# Patient Record
Sex: MALE | Race: BLACK OR AFRICAN AMERICAN | Employment: OTHER | URBAN - METROPOLITAN AREA
[De-identification: names, ages, dates, MRNs, and addresses within clinical notes are randomized per-mention and may not be internally consistent; named-entity substitution may affect disease eponyms.]

---

## 2017-05-18 PROBLEM — R60.9 PERIPHERAL EDEMA: Status: ACTIVE | Noted: 2017-05-18

## 2018-02-12 ENCOUNTER — HOSPITAL ENCOUNTER (OUTPATIENT)
Dept: LAB | Age: 58
Discharge: HOME | End: 2018-02-12

## 2018-02-12 PROCEDURE — 88305 TISSUE EXAM BY PATHOLOGIST: CPT | Performed by: UROLOGY

## 2018-02-14 ENCOUNTER — HOSPITAL ENCOUNTER (OUTPATIENT)
Dept: LAB | Age: 58
Discharge: HOME | End: 2018-02-14
Payer: COMMERCIAL

## 2018-02-14 PROCEDURE — 88344 IMHCHEM/IMCYTCHM EA MLT ANTB: CPT

## 2018-04-06 PROBLEM — K40.90 LEFT INGUINAL HERNIA: Status: ACTIVE | Noted: 2018-04-06

## 2018-04-06 PROBLEM — R60.9 PERIPHERAL EDEMA: Status: RESOLVED | Noted: 2017-05-18 | Resolved: 2018-04-06

## 2018-04-06 PROBLEM — K59.00 CONSTIPATION: Chronic | Status: ACTIVE | Noted: 2018-04-06

## 2018-04-06 PROBLEM — K59.00 CONSTIPATION: Status: ACTIVE | Noted: 2018-04-06

## 2018-04-06 PROBLEM — K40.90 LEFT INGUINAL HERNIA: Chronic | Status: ACTIVE | Noted: 2018-04-06

## 2018-12-04 NOTE — H&P (VIEW-ONLY)
Date: 2018      Name: Lauri Card      MRN: [id number]       : 1960       Age: 59 y.o. Sex: male        Wayne Riojas MD       CC:  No chief complaint on file. HPI:     Lauri Card is a 59 y.o. male who presents for evaluation of an inguinal hernia as a referral from Dr. Macy Westfall. The patient was seen on 18 for a left inguinal hernia that was reducible. I recommended surgery, but he never scheduled. He comes back with the same problem. The left groin has had more pain over the past 3 months. He has now decided he would like to have the hernia repaired. PMH:    Past Medical History:   Diagnosis Date    Arthritis     Bone fracture     cracked right ankle    Elevated PSA     Hypertension     Ligament tear of lower extremity     right ankle       PSH:    Past Surgical History:   Procedure Laterality Date    ABDOMEN SURGERY PROC UNLISTED  2018    inguinal hernia repair    HX ANKLE FRACTURE TX Right ,     HX COLONOSCOPY      MD COLSC FLX W/REMOVAL LESION BY HOT BX FORCEPS  2015            MEDS:    Current Outpatient Medications   Medication Sig    zolpidem (AMBIEN) 10 mg tablet Take 1/2 to 1 tablet at bedtime as needed for insomnia    amLODIPine (NORVASC) 5 mg tablet TAKE ONE (1) TABLET BY MOUTH DAILY    hydroCHLOROthiazide (MICROZIDE) 12.5 mg capsule Take 1 Cap by mouth daily.  terbinafine HCl (LAMISIL) 1 % topical cream Apply  to affected area two (2) times a day.  hydrocortisone (ANUSOL-HC) 25 mg supp Insert 1 Suppository into rectum every twelve (12) hours. PRN    senna-docusate (PERICOLACE) 8.6-50 mg per tablet Take 1-2 Tabs by mouth daily. Indications: constipation    triamcinolone acetonide (KENALOG) 0.1 % topical cream Apply  to affected area two (2) times a day. use thin layer     No current facility-administered medications for this visit.         ALLERGIES:      No Known Allergies    SH:    Social History     Tobacco Use    Smoking status: Former Smoker     Packs/day: 0.75     Years: 20.00     Pack years: 15.00     Types: Cigarettes     Last attempt to quit: 2000     Years since quittin.9    Smokeless tobacco: Never Used   Substance Use Topics    Alcohol use: No     Alcohol/week: 0.0 oz    Drug use: No       FH:    Family History   Problem Relation Age of Onset    Cancer Mother         colon    No Known Problems Sister     No Known Problems Father     Hypertension Brother     No Known Problems Sister     No Known Problems Sister     No Known Problems Sister     Diabetes Sister     No Known Problems Maternal Grandmother     No Known Problems Maternal Grandfather     No Known Problems Paternal Grandmother     No Known Problems Paternal Grandfather        ROS: The patient has no difficulty with chest pain or shortness of breath. No fever or chills. Comprehensive 13 point review of systems was otherwise unremarkable except as noted above. Physical Exam:     There were no vitals taken for this visit. General: Alert, oriented, cooperative black male in no acute distress. Eyes: Sclera are clear. Conjunctiva and lids within normal limits. No icterus. Ears and Nose: no gross deformities to visual inspection, gross hearing intact  Neck: Supple, trachea midline, no appreciable thyromegaly  Resp: Breathing is  non-labored. Lungs clear to auscultation without wheezing or rhonchi   CV: RRR. No murmurs, rubs or gallops appreciated. Abd: soft non-tender and non-distended without peritoneal signs. +bs    Groin: Small, reducible left inguinal hernia. No evidence of a right inguinal hernia. Psych:  Mood and affect appropriate. Short-term memory and understanding intact      Assessment/Plan:  Julián Rao is a 59 y.o. male who has signs and symptoms consistent with left inguinal hernia.     1. Laparoscopic, possible open, left inguinal hernia repair with mesh     I went over the risks of bleeding, infection, anesthesia, injury to the small bowel, large bowel, bladder and neurovascular structures in the groin area as well as the potential need to convert to an open procedure. Another potential problem mentioned was the risk of recurrence of the hernia. I went over the use of mesh. I highlighted the importance of following the post-op instructions, particularly the lifting restrictions. The patient understood the risks and wished to proceed.     Ildefonso Tubbs MD     [facility]   12/4/2018  6:45 PM

## 2018-12-20 ENCOUNTER — HOSPITAL ENCOUNTER (OUTPATIENT)
Dept: SURGERY | Age: 58
Discharge: HOME | End: 2018-12-20

## 2018-12-20 VITALS — BODY MASS INDEX: 39.37 KG/M2 | HEIGHT: 70 IN | WEIGHT: 275 LBS

## 2018-12-20 NOTE — PERIOP NOTES
Patient verified name and . Order for consent NOT found in EHR- unable to determine if it matches case posting; patient verifies procedure. Type 2 surgery, phone assessment complete. Orders not received. Labs per surgeon: no order in EMR at this time  Labs per anesthesia protocol: hgb, K+ and creatinine needed- CBC and CMP collected 18- results within anesthesia guidelines    Patient answered medical/surgical history questions at their best of ability. All prior to admission medications documented in [facility]. Patient instructed to take the following medications the day of surgery according to anesthesia guidelines with a small sip of water: amlodipine. Hold all vitamins 7 days prior to surgery and NSAIDS 5 days prior to surgery. Medications to be held- HCTZ DOS. Patient instructed on the following:  Arrive at A Entrance, time of arrival to be called the day before by 1700  NPO after midnight including gum, mints, and ice chips  Responsible adult must drive patient to the hospital, stay during surgery, and patient will need supervision 24 hours after anesthesia  Use antibacterial soap in shower the night before surgery and on the morning of surgery  All piercings must be removed prior to arrival.    Leave all valuables (money and jewelry) at home but bring insurance card and ID on DOS. Do not wear make-up, nail polish, lotions, cologne, perfumes, powders, or oil on skin. Patient teach back successful and patient demonstrates knowledge of instruction.

## 2018-12-25 ENCOUNTER — ANESTHESIA EVENT (OUTPATIENT)
Dept: SURGERY | Age: 58
End: 2018-12-25
Payer: COMMERCIAL

## 2018-12-26 ENCOUNTER — ANESTHESIA (OUTPATIENT)
Dept: SURGERY | Age: 58
End: 2018-12-26
Payer: COMMERCIAL

## 2018-12-26 ENCOUNTER — HOSPITAL ENCOUNTER (OUTPATIENT)
Age: 58
Setting detail: OUTPATIENT SURGERY
Discharge: HOME | End: 2018-12-26
Attending: SURGERY | Admitting: SURGERY
Payer: COMMERCIAL

## 2018-12-26 VITALS
TEMPERATURE: 98.5 F | RESPIRATION RATE: 16 BRPM | SYSTOLIC BLOOD PRESSURE: 174 MMHG | DIASTOLIC BLOOD PRESSURE: 77 MMHG | BODY MASS INDEX: 40.52 KG/M2 | WEIGHT: 282.4 LBS | OXYGEN SATURATION: 92 % | HEART RATE: 76 BPM

## 2018-12-26 DIAGNOSIS — K40.90 LEFT INGUINAL HERNIA: Primary | ICD-10-CM

## 2018-12-26 PROCEDURE — 77030035220 HC TRCR ENDOSC BLNTPRT ANCHR COVD -B: Performed by: SURGERY

## 2018-12-26 PROCEDURE — 77030002896 HC SUT CLP ABSRB J&J -B: Performed by: SURGERY

## 2018-12-26 PROCEDURE — 76210000016 HC OR PH I REC 1 TO 1.5 HR: Performed by: SURGERY

## 2018-12-26 PROCEDURE — 77030035051: Performed by: SURGERY

## 2018-12-26 PROCEDURE — 74011250636 HC RX REV CODE- 250/636

## 2018-12-26 PROCEDURE — 77030035048 HC TRCR ENDOSC OPTCL COVD -B: Performed by: SURGERY

## 2018-12-26 PROCEDURE — 77030039266 HC ADH SKN EXOFIN S2SG -A: Performed by: SURGERY

## 2018-12-26 PROCEDURE — 74011250636 HC RX REV CODE- 250/636: Performed by: ANESTHESIOLOGY

## 2018-12-26 PROCEDURE — C1727 CATH, BAL TIS DIS, NON-VAS: HCPCS | Performed by: SURGERY

## 2018-12-26 PROCEDURE — 76060000033 HC ANESTHESIA 1 TO 1.5 HR: Performed by: SURGERY

## 2018-12-26 PROCEDURE — 77030002912 HC SUT ETHBND J&J -A: Performed by: SURGERY

## 2018-12-26 PROCEDURE — 77030010299 HC STPLR INT COVD -E: Performed by: SURGERY

## 2018-12-26 PROCEDURE — 77030008522 HC TBNG INSUF LAPRO STRY -B: Performed by: SURGERY

## 2018-12-26 PROCEDURE — 77030039425 HC BLD LARYNG TRULITE DISP TELE -A: Performed by: ANESTHESIOLOGY

## 2018-12-26 PROCEDURE — 77030002933 HC SUT MCRYL J&J -A: Performed by: SURGERY

## 2018-12-26 PROCEDURE — 77030020782 HC GWN BAIR PAWS FLX 3M -B: Performed by: ANESTHESIOLOGY

## 2018-12-26 PROCEDURE — 74011250637 HC RX REV CODE- 250/637: Performed by: ANESTHESIOLOGY

## 2018-12-26 PROCEDURE — 77030008703 HC TU ET UNCUF COVD -A: Performed by: ANESTHESIOLOGY

## 2018-12-26 PROCEDURE — C1781 MESH (IMPLANTABLE): HCPCS | Performed by: SURGERY

## 2018-12-26 PROCEDURE — 77030034849: Performed by: SURGERY

## 2018-12-26 PROCEDURE — 74011250636 HC RX REV CODE- 250/636: Performed by: SURGERY

## 2018-12-26 PROCEDURE — 77030031139 HC SUT VCRL2 J&J -A: Performed by: SURGERY

## 2018-12-26 PROCEDURE — 74011000250 HC RX REV CODE- 250

## 2018-12-26 PROCEDURE — 76010000161 HC OR TIME 1 TO 1.5 HR INTENSV-TIER 1: Performed by: SURGERY

## 2018-12-26 PROCEDURE — 77030008477 HC STYL SATN SLP COVD -A: Performed by: ANESTHESIOLOGY

## 2018-12-26 DEVICE — MESH HERN L W10.8XL16CM L INGUINAL WHT POLYPR MFIL: Type: IMPLANTABLE DEVICE | Site: ABDOMEN | Status: FUNCTIONAL

## 2018-12-26 RX ORDER — SODIUM CHLORIDE, SODIUM LACTATE, POTASSIUM CHLORIDE, CALCIUM CHLORIDE 600; 310; 30; 20 MG/100ML; MG/100ML; MG/100ML; MG/100ML
75 INJECTION, SOLUTION INTRAVENOUS CONTINUOUS
Status: DISCONTINUED | OUTPATIENT
Start: 2018-12-26 | End: 2018-12-26 | Stop reason: HOSPADM

## 2018-12-26 RX ORDER — LIDOCAINE HYDROCHLORIDE 10 MG/ML
0.1 INJECTION INFILTRATION; PERINEURAL AS NEEDED
Status: DISCONTINUED | OUTPATIENT
Start: 2018-12-26 | End: 2018-12-26 | Stop reason: HOSPADM

## 2018-12-26 RX ORDER — OXYCODONE HYDROCHLORIDE 5 MG/1
10 TABLET ORAL
Status: DISCONTINUED | OUTPATIENT
Start: 2018-12-26 | End: 2018-12-26 | Stop reason: HOSPADM

## 2018-12-26 RX ORDER — HYDROMORPHONE HYDROCHLORIDE 2 MG/ML
0.5 INJECTION, SOLUTION INTRAMUSCULAR; INTRAVENOUS; SUBCUTANEOUS
Status: DISCONTINUED | OUTPATIENT
Start: 2018-12-26 | End: 2018-12-26 | Stop reason: HOSPADM

## 2018-12-26 RX ORDER — LIDOCAINE HYDROCHLORIDE 20 MG/ML
INJECTION, SOLUTION EPIDURAL; INFILTRATION; INTRACAUDAL; PERINEURAL AS NEEDED
Status: DISCONTINUED | OUTPATIENT
Start: 2018-12-26 | End: 2018-12-26 | Stop reason: HOSPADM

## 2018-12-26 RX ORDER — DEXAMETHASONE SODIUM PHOSPHATE 4 MG/ML
INJECTION, SOLUTION INTRA-ARTICULAR; INTRALESIONAL; INTRAMUSCULAR; INTRAVENOUS; SOFT TISSUE AS NEEDED
Status: DISCONTINUED | OUTPATIENT
Start: 2018-12-26 | End: 2018-12-26 | Stop reason: HOSPADM

## 2018-12-26 RX ORDER — ONDANSETRON 2 MG/ML
INJECTION INTRAMUSCULAR; INTRAVENOUS AS NEEDED
Status: DISCONTINUED | OUTPATIENT
Start: 2018-12-26 | End: 2018-12-26 | Stop reason: HOSPADM

## 2018-12-26 RX ORDER — MIDAZOLAM HYDROCHLORIDE 1 MG/ML
2 INJECTION, SOLUTION INTRAMUSCULAR; INTRAVENOUS ONCE
Status: DISCONTINUED | OUTPATIENT
Start: 2018-12-26 | End: 2018-12-26 | Stop reason: HOSPADM

## 2018-12-26 RX ORDER — ROCURONIUM BROMIDE 10 MG/ML
INJECTION, SOLUTION INTRAVENOUS AS NEEDED
Status: DISCONTINUED | OUTPATIENT
Start: 2018-12-26 | End: 2018-12-26 | Stop reason: HOSPADM

## 2018-12-26 RX ORDER — FENTANYL CITRATE 50 UG/ML
INJECTION, SOLUTION INTRAMUSCULAR; INTRAVENOUS AS NEEDED
Status: DISCONTINUED | OUTPATIENT
Start: 2018-12-26 | End: 2018-12-26 | Stop reason: HOSPADM

## 2018-12-26 RX ORDER — CEFAZOLIN SODIUM 1 G/3ML
INJECTION, POWDER, FOR SOLUTION INTRAMUSCULAR; INTRAVENOUS AS NEEDED
Status: DISCONTINUED | OUTPATIENT
Start: 2018-12-26 | End: 2018-12-26 | Stop reason: HOSPADM

## 2018-12-26 RX ORDER — EPHEDRINE SULFATE 50 MG/ML
INJECTION, SOLUTION INTRAVENOUS AS NEEDED
Status: DISCONTINUED | OUTPATIENT
Start: 2018-12-26 | End: 2018-12-26 | Stop reason: HOSPADM

## 2018-12-26 RX ORDER — NEOSTIGMINE METHYLSULFATE 1 MG/ML
INJECTION INTRAVENOUS AS NEEDED
Status: DISCONTINUED | OUTPATIENT
Start: 2018-12-26 | End: 2018-12-26 | Stop reason: HOSPADM

## 2018-12-26 RX ORDER — FENTANYL CITRATE 50 UG/ML
100 INJECTION, SOLUTION INTRAMUSCULAR; INTRAVENOUS ONCE
Status: DISCONTINUED | OUTPATIENT
Start: 2018-12-26 | End: 2018-12-26 | Stop reason: HOSPADM

## 2018-12-26 RX ORDER — OXYCODONE AND ACETAMINOPHEN 5; 325 MG/1; MG/1
1 TABLET ORAL
Qty: 30 TAB | Refills: 0 | Status: SHIPPED | OUTPATIENT
Start: 2018-12-26 | End: 2019-01-08 | Stop reason: ALTCHOICE

## 2018-12-26 RX ORDER — OXYCODONE HYDROCHLORIDE 5 MG/1
5 TABLET ORAL
Status: DISCONTINUED | OUTPATIENT
Start: 2018-12-26 | End: 2018-12-26 | Stop reason: HOSPADM

## 2018-12-26 RX ORDER — CEFAZOLIN SODIUM/WATER 2 G/20 ML
2 SYRINGE (ML) INTRAVENOUS
Status: DISCONTINUED | OUTPATIENT
Start: 2018-12-26 | End: 2018-12-26

## 2018-12-26 RX ORDER — PROPOFOL 10 MG/ML
INJECTION, EMULSION INTRAVENOUS AS NEEDED
Status: DISCONTINUED | OUTPATIENT
Start: 2018-12-26 | End: 2018-12-26 | Stop reason: HOSPADM

## 2018-12-26 RX ORDER — GLYCOPYRROLATE 0.2 MG/ML
INJECTION INTRAMUSCULAR; INTRAVENOUS AS NEEDED
Status: DISCONTINUED | OUTPATIENT
Start: 2018-12-26 | End: 2018-12-26 | Stop reason: HOSPADM

## 2018-12-26 RX ORDER — FAMOTIDINE 20 MG/1
20 TABLET, FILM COATED ORAL ONCE
Status: COMPLETED | OUTPATIENT
Start: 2018-12-26 | End: 2018-12-26

## 2018-12-26 RX ADMIN — CEFAZOLIN SODIUM 3 G: 1 INJECTION, POWDER, FOR SOLUTION INTRAMUSCULAR; INTRAVENOUS at 13:48

## 2018-12-26 RX ADMIN — SODIUM CHLORIDE, SODIUM LACTATE, POTASSIUM CHLORIDE, AND CALCIUM CHLORIDE 75 ML/HR: 600; 310; 30; 20 INJECTION, SOLUTION INTRAVENOUS at 12:25

## 2018-12-26 RX ADMIN — EPHEDRINE SULFATE 15 MG: 50 INJECTION, SOLUTION INTRAVENOUS at 14:07

## 2018-12-26 RX ADMIN — GLYCOPYRROLATE 0.4 MG: 0.2 INJECTION INTRAMUSCULAR; INTRAVENOUS at 14:57

## 2018-12-26 RX ADMIN — PROPOFOL 200 MG: 10 INJECTION, EMULSION INTRAVENOUS at 13:52

## 2018-12-26 RX ADMIN — ROCURONIUM BROMIDE 10 MG: 10 INJECTION, SOLUTION INTRAVENOUS at 14:41

## 2018-12-26 RX ADMIN — HYDROMORPHONE HYDROCHLORIDE 0.5 MG: 2 INJECTION, SOLUTION INTRAMUSCULAR; INTRAVENOUS; SUBCUTANEOUS at 15:23

## 2018-12-26 RX ADMIN — SODIUM CHLORIDE, SODIUM LACTATE, POTASSIUM CHLORIDE, AND CALCIUM CHLORIDE: 600; 310; 30; 20 INJECTION, SOLUTION INTRAVENOUS at 14:34

## 2018-12-26 RX ADMIN — SODIUM CHLORIDE, SODIUM LACTATE, POTASSIUM CHLORIDE, AND CALCIUM CHLORIDE: 600; 310; 30; 20 INJECTION, SOLUTION INTRAVENOUS at 14:28

## 2018-12-26 RX ADMIN — ROCURONIUM BROMIDE 40 MG: 10 INJECTION, SOLUTION INTRAVENOUS at 13:52

## 2018-12-26 RX ADMIN — FENTANYL CITRATE 50 MCG: 50 INJECTION, SOLUTION INTRAMUSCULAR; INTRAVENOUS at 13:52

## 2018-12-26 RX ADMIN — NEOSTIGMINE METHYLSULFATE 3 MG: 1 INJECTION INTRAVENOUS at 14:57

## 2018-12-26 RX ADMIN — FENTANYL CITRATE 25 MCG: 50 INJECTION, SOLUTION INTRAMUSCULAR; INTRAVENOUS at 14:25

## 2018-12-26 RX ADMIN — FAMOTIDINE 20 MG: 20 TABLET ORAL at 12:25

## 2018-12-26 RX ADMIN — FENTANYL CITRATE 25 MCG: 50 INJECTION, SOLUTION INTRAMUSCULAR; INTRAVENOUS at 14:19

## 2018-12-26 RX ADMIN — DEXAMETHASONE SODIUM PHOSPHATE 10 MG: 4 INJECTION, SOLUTION INTRA-ARTICULAR; INTRALESIONAL; INTRAMUSCULAR; INTRAVENOUS; SOFT TISSUE at 14:08

## 2018-12-26 RX ADMIN — ONDANSETRON 4 MG: 2 INJECTION INTRAMUSCULAR; INTRAVENOUS at 14:08

## 2018-12-26 RX ADMIN — LIDOCAINE HYDROCHLORIDE 100 MG: 20 INJECTION, SOLUTION EPIDURAL; INFILTRATION; INTRACAUDAL; PERINEURAL at 13:52

## 2018-12-26 RX ADMIN — Medication 3 G: at 12:24

## 2018-12-26 NOTE — INTERVAL H&P NOTE
H&P Update:  Alissa Heimlich was seen and examined. History and physical has been reviewed. The patient has been examined.  There have been no significant clinical changes since the completion of the originally dated History and Physical.    Signed By: Dareen Spatz, MD     December 26, 2018 12:17 PM

## 2018-12-26 NOTE — ANESTHESIA PREPROCEDURE EVALUATION
Anesthetic History   No history of anesthetic complications            Review of Systems / Medical History  Patient summary reviewed and pertinent labs reviewed    Pulmonary  Within defined limits                 Neuro/Psych   Within defined limits           Cardiovascular    Hypertension          CAD    Exercise tolerance: >4 METS     GI/Hepatic/Renal  Within defined limits              Endo/Other        Morbid obesity     Other Findings   Comments: exsmoker  ED  Insomnia  BPH         Physical Exam    Airway  Mallampati: II  TM Distance: 4 - 6 cm  Neck ROM: normal range of motion   Mouth opening: Normal     Cardiovascular  Regular rate and rhythm,  S1 and S2 normal,  no murmur, click, rub, or gallop  Rhythm: regular  Rate: normal         Dental    Dentition: Full upper dentures and Lower partial plate     Pulmonary  Breath sounds clear to auscultation               Abdominal  Abdominal exam normal       Other Findings            Anesthetic Plan    ASA: 3  Anesthesia type: general          Induction: Intravenous  Anesthetic plan and risks discussed with: Patient

## 2018-12-26 NOTE — OP NOTES
[address] [facility]  OPERATIVE REPORT    Balbina Sheehan  MR#: [id number]  : 1960  ACCOUNT #: [de-identified]   DATE OF SERVICE: 2018    PREOPERATIVE DIAGNOSES:  Left inguinal hernia and morbid obesity. POSTOPERATIVE DIAGNOSES:  Left inguinal hernia and morbid obesity. PROCEDURE PERFORMED:  Attempted TEPP left inguinal hernia, converted to a DOMO left inguinal hernia. SURGEON:  Taqueria Laguerre. Osman Conroy MD    ANESTHESIA:  General endotracheal anesthesia plus 20 mL 0.75% Marcaine used for local anesthesia at the end of the procedure. ESTIMATED BLOOD LOSS:  50 mL. SPECIMENS REMOVED:  None. COMPLICATIONS:  None. IMPLANTS:  None. ASSISTANT:  None. DRAINS:  None. HISTORY:  A 49-year-old male, who I was asked to see by Dr. Sara Young for left groin pain. I saw him in 2018. He returned in 10/2018 with left groin pain. I had originally recommended to him scheduling a laparoscopic, possible open, inguinal hernia repair. He failed to schedule it, came back in October, and then decided that he wanted to proceed with surgery. The patient was seen in the office. I went through the risks of bleeding, infection, anesthesia, injury to the small bowel, large bowel, bladder, neurovascular structures in the groin area, as well as potential for recurrence of the hernia. He was agreeable and signed a consent form. DESCRIPTION OF PROCEDURE:  The patient was seen in the preop area, where the left groin area was marked with a marking pen after he consented to my marking the area. We then transferred him to room #4 [facility].  He was placed on the operating table in supine position. General endotracheal anesthesia was administered without complication. He received 2 g of Ancef as prophylactic antibiotic coverage. The abdomen was prepped and draped in the usual sterile manner. Pepe catheter was already inserted by the nursing staff. Sequential compression devices were placed and used throughout the procedure. Timeout was done, identifying patient, surgeon, procedure, birthday of 1960. I made an incision inferior and to the left of the umbilicus overlying the left rectus abdominis muscle. The patient is morbidly obese. The adipose tissue was longer than the deep ends of the [employer] retractors. I enlarged the incision a little bit to get better visualization. We were able to place two stay sutures on either side of the anterior rectus sheath fascia that was transected. Finger dissection was used to develop the preperitoneal space. I put a balloon in. He has some adhesions in the preperitoneal space. The first balloon broke. We placed a second balloon in the area and got very limited dissection. We put a Timur in to this area, dissected, turned on the gas, and put the camera in. I made 2 incisions between the pubic bone and umbilicus and we placed two 5 mm trocars. There was limited space, limited visualization. He appears to have had something obliterate this preperitoneal space in the past.  At this point, we changed directions and instead of doing a TEPP (trans extra preperitoneal repair), we did a DOMO (total abdominal preperitoneal approach). I made an aperture through the posterior rectus sheath and peritoneum and placed the Timur trocar into the abdominal cavity. The patient had 2 other 5 mm trocars placed in the upper part of the abdomen. I used the scissors to score the peritoneum, I was able to identify the indirect inguinal hernia sac. It was pulled back away from this testicular artery, vein, and the spermatic cord, and lay in the preperitoneal space. A large 3DMax piece of mesh was rolled up in a cigar type fashion, placed in the preperitoneal space. The preperitoneal space was then closed with a running. We did not fix the mesh in position at all.   The peritoneum was closed with a running 0 Ethibond and Lapra-Tys. The 5 mm trocars were removed. Some bleeding from the trocar sites. The fascia of the umbilicus was closed with interrupted sutures of 0 Vicryl. All skin incisions were closed with surgical staples. The patient received 20 mL 0.75% Marcaine in divided dosage to the incision sites. The patient tolerated the procedure well, was extubated, and brought to recovery room in stable condition.       MD EWA Stevenson / Darlin Egan  D: 12/26/2018 15:47     T: 12/26/2018 18:24  JOB #: [id number]

## 2018-12-26 NOTE — BRIEF OP NOTE
BRIEF OPERATIVE NOTE    Date of Procedure: 12/26/2018   Preoperative Diagnosis: Left inguinal hernia [K40.90]  Postoperative Diagnosis: Left inguinal hernia [K40.90]    Procedure(s): DOMO LAPAROSCOPIC LEFT INGUINAL HERNIA REPAIR WITH MESH  Surgeon(s) and Role:     Romayne Andrews, MD - Primary         Surgical Assistant: None    Surgical Staff:  Circ-1: Cathy Kurtz RN  Scrub Tech-1: Artis Burrell  Scrub Tech-2: Veronica Garcia  Event Time In Time Out   Incision Start 1402    Incision Close       Anesthesia: General   Estimated Blood Loss: 25 cc's  Specimens: * No specimens in log *   Findings: See dictated note   Complications: None  Implants:   Implant Name Type Inv.  Item Serial No.  Lot No. LRB No. Used Action   MESH PATY LG LT 4X6IN -- 3DMAX - [id number]  MESH PATY LG LT 4X6IN -- 3DMAX [id number] BARD DAVOL [id number] Left 1 Implanted

## 2018-12-26 NOTE — DISCHARGE INSTRUCTIONS
1. Diet as tolerated. 2. Showering is allowed, but no tub baths or swimming. 3. Drainage is common from the wounds. Change the dressings as needed. Call our office if the wounds become reddened, tender, feel warm to the touch or pus starts to drain from the wound. 4. Take prescribed pain medication as directed, usually Percocet, Vicodin, Lorcet or Darvcocet. Take over the counter medication for minor pain. 5. Ice may be applied intermittently to the surgical site or sites. 6. Call or office, [phone], if problems arise. 7. Follow up in the office at the assigned time. 8. Resume all medications as taken per surgery, unless specifically instructed not to take certain ones. 9. No lifting more than 25 pounds until told otherwise. 10. Driving is allowed 5 days after surgery as long as you feel comfortable enough to drive and have not taken any prescription pain medication prior to driving. 11. Wear an athletic supporter when out of bed. 12. Place ice over the affected area intermittently for the first 2 to 3 days. 15. It is expected to have \"black and blue\" areas over the lower abdomen, groin, scrotum and base of the penis. 14. If you do not have an allergy, then I recommend taking two Naprosyn (Alleve) tablets in the morning and two at night. Save the narcotic pain medication for in between the Naprosyn.

## 2022-03-18 PROBLEM — K59.00 CONSTIPATION: Status: ACTIVE | Noted: 2018-04-06

## 2022-03-20 PROBLEM — K40.90 LEFT INGUINAL HERNIA: Status: ACTIVE | Noted: 2018-04-06

## 2022-07-28 DIAGNOSIS — R97.20 ELEVATED PSA: Primary | ICD-10-CM

## 2023-02-27 ENCOUNTER — NURSE ONLY (OUTPATIENT)
Dept: UROLOGY | Age: 63
End: 2023-02-27

## 2023-02-27 DIAGNOSIS — R97.20 ELEVATED PSA: ICD-10-CM

## 2023-02-28 LAB
PSA FREE MFR SERPL: 18.1 %
PSA FREE SERPL-MCNC: 1.7 NG/ML
PSA SERPL-MCNC: 9.4 NG/ML

## 2023-03-13 ENCOUNTER — TELEPHONE (OUTPATIENT)
Dept: UROLOGY | Age: 63
End: 2023-03-13

## 2023-03-14 NOTE — TELEPHONE ENCOUNTER
Left message on machine for patient requesting that they call back to make an appointment for PSA bloodwork about 1 week prior to next visit with .

## 2023-04-21 DIAGNOSIS — R97.20 ELEVATED PSA: Primary | ICD-10-CM

## 2023-04-25 ENCOUNTER — NURSE ONLY (OUTPATIENT)
Dept: UROLOGY | Age: 63
End: 2023-04-25

## 2023-04-25 DIAGNOSIS — R97.20 ELEVATED PSA: ICD-10-CM

## 2023-04-26 LAB — PSA SERPL-MCNC: 8.3 NG/ML

## 2023-05-01 ENCOUNTER — OFFICE VISIT (OUTPATIENT)
Dept: UROLOGY | Age: 63
End: 2023-05-01
Payer: MEDICARE

## 2023-05-01 DIAGNOSIS — N13.8 BPH WITH OBSTRUCTION/LOWER URINARY TRACT SYMPTOMS: ICD-10-CM

## 2023-05-01 DIAGNOSIS — N40.1 BPH WITH OBSTRUCTION/LOWER URINARY TRACT SYMPTOMS: ICD-10-CM

## 2023-05-01 DIAGNOSIS — R97.20 ELEVATED PSA: Primary | ICD-10-CM

## 2023-05-01 LAB
BILIRUBIN, URINE, POC: NEGATIVE
BLOOD URINE, POC: NEGATIVE
GLUCOSE URINE, POC: NEGATIVE
KETONES, URINE, POC: NEGATIVE
LEUKOCYTE ESTERASE, URINE, POC: NORMAL
NITRITE, URINE, POC: NEGATIVE
PH, URINE, POC: 5.5 (ref 4.6–8)
PROTEIN,URINE, POC: NEGATIVE
SPECIFIC GRAVITY, URINE, POC: 1.01 (ref 1–1.03)
URINALYSIS CLARITY, POC: NORMAL
URINALYSIS COLOR, POC: NORMAL
UROBILINOGEN, POC: NORMAL

## 2023-05-01 PROCEDURE — 81003 URINALYSIS AUTO W/O SCOPE: CPT | Performed by: UROLOGY

## 2023-05-01 PROCEDURE — 99213 OFFICE O/P EST LOW 20 MIN: CPT | Performed by: UROLOGY

## 2023-05-01 RX ORDER — TAMSULOSIN HYDROCHLORIDE 0.4 MG/1
0.4 CAPSULE ORAL DAILY
Qty: 30 CAPSULE | Refills: 12 | Status: SHIPPED | OUTPATIENT
Start: 2023-05-01

## 2023-05-01 ASSESSMENT — ENCOUNTER SYMPTOMS
BACK PAIN: 0
COUGH: 0

## 2023-05-01 NOTE — PROGRESS NOTES
[facility]  [address]  [address] [facility], [address] [facility]  [address] [facility]  : 1960    Chief Complaint   Patient presents with    Follow-up        HPI     Maranda Trotter is a 57 y.o. male   hx of elevated PSA. the patient had a negative prostate biopsy in the past with Dr. Angeline Nguyen. PSA was 5.6 and 2012 when he had this biopsy his PSA was 9.0 . Had negative prostate biopsy by Dr. Otoniel Cuevas in -18. 50 gm prostate. PSA 6.0 then. Referred back by General Clipjuanita ORTIZ for follow up of elevated PSA. Total PSA 6.7 with 17.35 free PSA in -. He c/o weak stream and ED. Can't afford Viagra . started tamsulosin in . PSA 5.6 in -. PSA 6.4 in -. Had 3+ benign feeling prostate in . PSA 8.4 in 10-21. He has no complaints, voiding well on tamsulosin. No dysuria or hematuria. In -, total PSA 5.8, 22.2% free PSA. In -, total PSA 9.4 with 18.1% free PSA. PSA 8.3 in -. Past Medical History:   Diagnosis Date    Arthritis     Bone fracture     cracked right ankle    Elevated PSA     Hypertension     Ligament tear of lower extremity 2005    right ankle     Past Surgical History:   Procedure Laterality Date    ANKLE FRACTURE SURGERY Right ,     COLONOSCOPY      COLSC FLX W/REMOVAL LESION BY HOT BX FORCEPS  2015         HERNIA REPAIR Left 2018    inguinal     Current Outpatient Medications   Medication Sig Dispense Refill    tamsulosin (FLOMAX) 0.4 MG capsule Take 1 capsule by mouth daily 30 capsule 12    amLODIPine (NORVASC) 5 MG tablet TAKE ONE (1) TABLET BY MOUTH DAILY      hydroCHLOROthiazide (MICROZIDE) 12.5 MG capsule Take 12.5 mg by mouth daily      traZODone (DESYREL) 50 MG tablet TAKE 1 TABLET BY MOUTH NIGHTLY       No current facility-administered medications for this visit.      No Known Allergies  Social History     Socioeconomic History    Marital status:      Spouse name: Not on file    Number of children:

## 2023-05-10 ENCOUNTER — HOSPITAL ENCOUNTER (OUTPATIENT)
Dept: MRI IMAGING | Age: 63
Discharge: HOME | End: 2023-05-13

## 2023-05-10 DIAGNOSIS — R97.20 ELEVATED PSA: ICD-10-CM

## 2023-05-31 ENCOUNTER — PREP FOR PROCEDURE (OUTPATIENT)
Dept: UROLOGY | Age: 63
End: 2023-05-31

## 2023-05-31 ENCOUNTER — TELEPHONE (OUTPATIENT)
Dept: UROLOGY | Age: 63
End: 2023-05-31

## 2023-05-31 DIAGNOSIS — R97.20 ELEVATED PSA: Primary | ICD-10-CM

## 2023-05-31 RX ORDER — CIPROFLOXACIN 500 MG/1
500 TABLET, FILM COATED ORAL 2 TIMES DAILY
Qty: 6 TABLET | Refills: 0 | Status: SHIPPED | OUTPATIENT
Start: 2023-05-31 | End: 2023-06-03

## 2023-05-31 NOTE — TELEPHONE ENCOUNTER
Called pt about MRI. Schedule MRI fusion prostate biopsy. Diagnosis Orders   1. Elevated PSA  ciprofloxacin (CIPRO) 500 MG tablet        I eprescribed the med  Needs Cipro and enema. Did orders  . INFORMED CONSENT: The nature of the needle biopsy and the ultrasound and the  purpose was discussed with the patient. Risks include, but are not limited tobleeding into the urethra, bladder or rectum, infection, local pain, difficulty voiding and urinary retention requiring catheterization, inability to get an adequate amount of specimen for diagnosis, false positives and negatives, the need for further testing or open procedures based on biopsy results, and side effects from local anesthesia. The benefits are judged to outweigh the risks, should any of these complications occur, and no guarantees of success or outcome were given or implied. He states he has no further questions and agreed to proceed.

## 2023-06-01 NOTE — TELEPHONE ENCOUNTER
Procedures: Procedure(s):   MRI FUSION PROSTATE BIOPSY   Date: 7/25/2023   Time: 1330   Location: D OP OR 02     Scheduled.

## 2023-06-26 NOTE — ANESTHESIA POSTPROCEDURE EVALUATION
Procedure(s): HERNIA INGUINAL REPAIR LAPAROSCOPIC LEFT WITH MESH POSS OPEN.     Anesthesia Post Evaluation      Multimodal analgesia: multimodal analgesia used between 6 hours prior to anesthesia start to PACU discharge  Patient location during evaluation: PACU  Patient participation: complete - patient participated  Level of consciousness: awake and alert  Pain management: adequate  Airway patency: patent  Anesthetic complications: no  Cardiovascular status: acceptable and hemodynamically stable  Respiratory status: acceptable  Hydration status: acceptable        Visit Vitals  /77   Pulse 76   Temp 36.9 °C (98.5 °F)   Resp 16   Wt 128.1 kg (282 lb 6.4 oz)   SpO2 92%   BMI 40.52 kg/m²
normal...

## 2023-07-18 RX ORDER — ROSUVASTATIN CALCIUM 10 MG/1
10 TABLET, COATED ORAL NIGHTLY
COMMUNITY
Start: 2021-04-26

## 2023-07-18 NOTE — PERIOP NOTE
Patient verified name and . Order for consent  found in EHR and matches case posting; patient verifies procedure. Type 1A surgery, phone assessment complete. Orders  received. Labs per surgeon: UA s/h for pre-op  Labs per anesthesia protocol: not indicated    Patient answered medical/surgical history questions at their best of ability. All prior to admission medications documented in EPIC. Patient instructed to take the following medications the day of surgery according to anesthesia guidelines with a small sip of water: amlodipine,  flomax. Antibiotic and enema as instructed by Dr. Naheed Millan. Hold all vitamins and supplements 7 days prior to surgery and NSAIDS 5 days prior to surgery. Prescription meds to hold: hydrochlorothiazide hold the morning of surgery    Patient instructed on the following:    > Arrive at [address] [place] [address], time of arrival to be called the day before by 1700  > NPO after midnight, unless otherwise indicated, including gum, mints, and ice chips  > Responsible adult must drive patient to the hospital, stay during surgery, and patient will need supervision 24 hours after anesthesia  > Use antibacterial soap in shower the night before surgery and on the morning of surgery  > All piercings must be removed prior to arrival.    > Leave all valuables (money and jewelry) at home but bring insurance card and ID on DOS.   > You may be required to pay a deductible or co-pay on the day of your procedure. You can pre-pay by calling [phone] if your surgery is at the [facility] or [phone] if your surgery is at the [facility]. > Do not wear make-up, nail polish, lotions, cologne, perfumes, powders, or oil on skin.      Patient verbalized understanding

## 2023-07-24 ENCOUNTER — ANESTHESIA EVENT (OUTPATIENT)
Dept: SURGERY | Age: 63
End: 2023-07-24
Payer: MEDICARE

## 2023-07-25 ENCOUNTER — ANESTHESIA (OUTPATIENT)
Dept: SURGERY | Age: 63
End: 2023-07-25
Payer: MEDICARE

## 2023-07-25 ENCOUNTER — HOSPITAL ENCOUNTER (OUTPATIENT)
Age: 63
Setting detail: OUTPATIENT SURGERY
Discharge: HOME | End: 2023-07-25
Attending: UROLOGY | Admitting: UROLOGY
Payer: MEDICARE

## 2023-07-25 VITALS
HEART RATE: 66 BPM | OXYGEN SATURATION: 94 % | BODY MASS INDEX: 44.43 KG/M2 | HEIGHT: 69 IN | RESPIRATION RATE: 18 BRPM | SYSTOLIC BLOOD PRESSURE: 176 MMHG | TEMPERATURE: 98 F | WEIGHT: 300 LBS | DIASTOLIC BLOOD PRESSURE: 81 MMHG

## 2023-07-25 LAB
APPEARANCE UR: CLEAR
BACTERIA URNS QL MICRO: 0 /HPF
BILIRUB UR QL: NEGATIVE
COLOR UR: ABNORMAL
EPI CELLS #/AREA URNS HPF: ABNORMAL /HPF
GLUCOSE UR STRIP.AUTO-MCNC: NEGATIVE MG/DL
HGB UR QL STRIP: NEGATIVE
KETONES UR QL STRIP.AUTO: NEGATIVE MG/DL
LEUKOCYTE ESTERASE UR QL STRIP.AUTO: ABNORMAL
NITRITE UR QL STRIP.AUTO: NEGATIVE
PH UR STRIP: 5.5 (ref 5–9)
POTASSIUM BLD-SCNC: 4.1 MMOL/L (ref 3.5–5.1)
PROT UR STRIP-MCNC: NEGATIVE MG/DL
RBC #/AREA URNS HPF: ABNORMAL /HPF
SP GR UR REFRACTOMETRY: 1.01 (ref 1–1.02)
UROBILINOGEN UR QL STRIP.AUTO: 0.2 EU/DL (ref 0.2–1)
WBC URNS QL MICRO: ABNORMAL /HPF

## 2023-07-25 PROCEDURE — 6370000000 HC RX 637 (ALT 250 FOR IP): Performed by: ANESTHESIOLOGY

## 2023-07-25 PROCEDURE — 3600000002 HC SURGERY LEVEL 2 BASE: Performed by: UROLOGY

## 2023-07-25 PROCEDURE — 7100000001 HC PACU RECOVERY - ADDTL 15 MIN: Performed by: UROLOGY

## 2023-07-25 PROCEDURE — 7100000010 HC PHASE II RECOVERY - FIRST 15 MIN: Performed by: UROLOGY

## 2023-07-25 PROCEDURE — 2500000003 HC RX 250 WO HCPCS

## 2023-07-25 PROCEDURE — 7100000000 HC PACU RECOVERY - FIRST 15 MIN: Performed by: UROLOGY

## 2023-07-25 PROCEDURE — 6360000002 HC RX W HCPCS: Performed by: ANESTHESIOLOGY

## 2023-07-25 PROCEDURE — 2580000003 HC RX 258: Performed by: UROLOGY

## 2023-07-25 PROCEDURE — 55700 PR PROSTATE NEEDLE BIOPSY ANY APPROACH: CPT | Performed by: UROLOGY

## 2023-07-25 PROCEDURE — 2709999900 HC NON-CHARGEABLE SUPPLY: Performed by: UROLOGY

## 2023-07-25 PROCEDURE — 84132 ASSAY OF SERUM POTASSIUM: CPT

## 2023-07-25 PROCEDURE — 6360000002 HC RX W HCPCS

## 2023-07-25 PROCEDURE — 7100000011 HC PHASE II RECOVERY - ADDTL 15 MIN: Performed by: UROLOGY

## 2023-07-25 PROCEDURE — 3700000000 HC ANESTHESIA ATTENDED CARE: Performed by: UROLOGY

## 2023-07-25 PROCEDURE — 76872 US TRANSRECTAL: CPT | Performed by: UROLOGY

## 2023-07-25 PROCEDURE — 81001 URINALYSIS AUTO W/SCOPE: CPT

## 2023-07-25 PROCEDURE — 6360000002 HC RX W HCPCS: Performed by: UROLOGY

## 2023-07-25 PROCEDURE — 88305 TISSUE EXAM BY PATHOLOGIST: CPT

## 2023-07-25 PROCEDURE — 3700000001 HC ADD 15 MINUTES (ANESTHESIA): Performed by: UROLOGY

## 2023-07-25 PROCEDURE — 3600000012 HC SURGERY LEVEL 2 ADDTL 15MIN: Performed by: UROLOGY

## 2023-07-25 PROCEDURE — 2580000003 HC RX 258: Performed by: ANESTHESIOLOGY

## 2023-07-25 RX ORDER — FENTANYL CITRATE 50 UG/ML
25 INJECTION, SOLUTION INTRAMUSCULAR; INTRAVENOUS EVERY 5 MIN PRN
Status: DISCONTINUED | OUTPATIENT
Start: 2023-07-25 | End: 2023-07-25 | Stop reason: HOSPADM

## 2023-07-25 RX ORDER — ONDANSETRON 2 MG/ML
4 INJECTION INTRAMUSCULAR; INTRAVENOUS
Status: DISCONTINUED | OUTPATIENT
Start: 2023-07-25 | End: 2023-07-25 | Stop reason: HOSPADM

## 2023-07-25 RX ORDER — SODIUM CHLORIDE, SODIUM LACTATE, POTASSIUM CHLORIDE, CALCIUM CHLORIDE 600; 310; 30; 20 MG/100ML; MG/100ML; MG/100ML; MG/100ML
INJECTION, SOLUTION INTRAVENOUS CONTINUOUS
Status: DISCONTINUED | OUTPATIENT
Start: 2023-07-25 | End: 2023-07-25 | Stop reason: HOSPADM

## 2023-07-25 RX ORDER — DIPHENHYDRAMINE HYDROCHLORIDE 50 MG/ML
12.5 INJECTION INTRAMUSCULAR; INTRAVENOUS
Status: DISCONTINUED | OUTPATIENT
Start: 2023-07-25 | End: 2023-07-25 | Stop reason: HOSPADM

## 2023-07-25 RX ORDER — METOCLOPRAMIDE HYDROCHLORIDE 5 MG/ML
10 INJECTION INTRAMUSCULAR; INTRAVENOUS
Status: DISCONTINUED | OUTPATIENT
Start: 2023-07-25 | End: 2023-07-25 | Stop reason: HOSPADM

## 2023-07-25 RX ORDER — AMLODIPINE BESYLATE 5 MG/1
5 TABLET ORAL ONCE
Status: COMPLETED | OUTPATIENT
Start: 2023-07-25 | End: 2023-07-25

## 2023-07-25 RX ORDER — HYDROMORPHONE HYDROCHLORIDE 2 MG/ML
0.5 INJECTION, SOLUTION INTRAMUSCULAR; INTRAVENOUS; SUBCUTANEOUS EVERY 10 MIN PRN
Status: DISCONTINUED | OUTPATIENT
Start: 2023-07-25 | End: 2023-07-25 | Stop reason: HOSPADM

## 2023-07-25 RX ORDER — SCOLOPAMINE TRANSDERMAL SYSTEM 1 MG/1
1 PATCH, EXTENDED RELEASE TRANSDERMAL
Status: DISCONTINUED | OUTPATIENT
Start: 2023-07-25 | End: 2023-07-25 | Stop reason: HOSPADM

## 2023-07-25 RX ORDER — MIDAZOLAM HYDROCHLORIDE 2 MG/2ML
2 INJECTION, SOLUTION INTRAMUSCULAR; INTRAVENOUS
Status: DISCONTINUED | OUTPATIENT
Start: 2023-07-25 | End: 2023-07-25 | Stop reason: HOSPADM

## 2023-07-25 RX ORDER — OXYCODONE HYDROCHLORIDE 5 MG/1
5 TABLET ORAL
Status: COMPLETED | OUTPATIENT
Start: 2023-07-25 | End: 2023-07-25

## 2023-07-25 RX ORDER — FENTANYL CITRATE 50 UG/ML
100 INJECTION, SOLUTION INTRAMUSCULAR; INTRAVENOUS
Status: DISCONTINUED | OUTPATIENT
Start: 2023-07-25 | End: 2023-07-25 | Stop reason: HOSPADM

## 2023-07-25 RX ORDER — SODIUM CHLORIDE 0.9 % (FLUSH) 0.9 %
5-40 SYRINGE (ML) INJECTION EVERY 12 HOURS SCHEDULED
Status: DISCONTINUED | OUTPATIENT
Start: 2023-07-25 | End: 2023-07-25 | Stop reason: HOSPADM

## 2023-07-25 RX ORDER — PROPOFOL 10 MG/ML
INJECTION, EMULSION INTRAVENOUS PRN
Status: DISCONTINUED | OUTPATIENT
Start: 2023-07-25 | End: 2023-07-25 | Stop reason: SDUPTHER

## 2023-07-25 RX ORDER — LIDOCAINE HYDROCHLORIDE 20 MG/ML
INJECTION, SOLUTION EPIDURAL; INFILTRATION; INTRACAUDAL; PERINEURAL PRN
Status: DISCONTINUED | OUTPATIENT
Start: 2023-07-25 | End: 2023-07-25 | Stop reason: SDUPTHER

## 2023-07-25 RX ADMIN — HYDROMORPHONE HYDROCHLORIDE 0.5 MG: 2 INJECTION, SOLUTION INTRAMUSCULAR; INTRAVENOUS; SUBCUTANEOUS at 15:40

## 2023-07-25 RX ADMIN — PROPOFOL 50 MG: 10 INJECTION, EMULSION INTRAVENOUS at 15:09

## 2023-07-25 RX ADMIN — LIDOCAINE HYDROCHLORIDE 60 MG: 20 INJECTION, SOLUTION EPIDURAL; INFILTRATION; INTRACAUDAL; PERINEURAL at 15:09

## 2023-07-25 RX ADMIN — SODIUM CHLORIDE, POTASSIUM CHLORIDE, SODIUM LACTATE AND CALCIUM CHLORIDE: 600; 310; 30; 20 INJECTION, SOLUTION INTRAVENOUS at 12:09

## 2023-07-25 RX ADMIN — CEFTRIAXONE 1000 MG: 1 INJECTION, POWDER, FOR SOLUTION INTRAMUSCULAR; INTRAVENOUS at 15:08

## 2023-07-25 RX ADMIN — AMLODIPINE BESYLATE 5 MG: 5 TABLET ORAL at 12:24

## 2023-07-25 RX ADMIN — PROPOFOL 180 MCG/KG/MIN: 10 INJECTION, EMULSION INTRAVENOUS at 15:11

## 2023-07-25 RX ADMIN — OXYCODONE HYDROCHLORIDE 5 MG: 5 TABLET ORAL at 15:40

## 2023-07-25 ASSESSMENT — PAIN SCALES - GENERAL
PAINLEVEL_OUTOF10: 4
PAINLEVEL_OUTOF10: 8

## 2023-07-25 ASSESSMENT — PAIN - FUNCTIONAL ASSESSMENT: PAIN_FUNCTIONAL_ASSESSMENT: 0-10

## 2023-07-25 ASSESSMENT — PAIN DESCRIPTION - LOCATION: LOCATION: INCISION;RECTUM

## 2023-07-25 NOTE — OP NOTE
Operative Note      Patient: Keila Ji  YOB: 1960  MRN: [id number]    Date of Procedure: 7/25/2023    Pre-Op Diagnosis Codes:     * Elevated PSA [R97.20]    Post-Op Diagnosis: Same       Procedure(s):  MRI FUSION PROSTATE BIOPSY    Surgeon(s):  Sheyla Moyer MD    Assistant:   * No surgical staff found *    Anesthesia: Monitor Anesthesia Care    Estimated Blood Loss (mL): Minimal    Complications: None    Specimens:   ID Type Source Tests Collected by Time Destination   A : NERISSA-1 Tissue Prostate SURGICAL PATHOLOGY Sheyla Moyer MD 7/25/2023 1440    B : NERISSA-2 Tissue Prostate SURGICAL PATHOLOGY Sheyla Moyer MD 7/25/2023 1440    C : NERISSA-3 Tissue Prostate SURGICAL PATHOLOGY Sheyla Moyer MD 7/25/2023 1440    D : Rock Ko Tissue Prostate [place] Randa West MD 7/25/2023 1440    E : RM Tissue Prostate SURGICAL PATHOLOGY Sheyla Moyer MD 7/25/2023 1440    F : RA Tissue Prostate SURGICAL PATHOLOGY Sheyla Moyer MD 7/25/2023 1440    G : RLB Tissue Prostate SURGICAL PATHOLOGY Sheyla Moyer MD 7/25/2023 1440    H : RLM Tissue Prostate SURGICAL PATHOLOGY Sheyla Moyer MD 7/25/2023 1440    I : RLA Tissue Prostate SURGICAL PATHOLOGY Sheyla Moyer MD 7/25/2023 1441    J : LB Tissue Prostate SURGICAL PATHOLOGY Sheyla Moyer MD 7/25/2023 1441    K : LM Tissue Prostate SURGICAL PATHOLOGY Sheyla Moyer MD 7/25/2023 1441    L : LA Tissue Prostate SURGICAL PATHOLOGY Sheyla Moyer MD 7/25/2023 1441    M : LLB Tissue Prostate SURGICAL PATHOLOGY Sheyla Moyer MD 7/25/2023 1441    N : LLM Tissue Prostate SURGICAL PATHOLOGY Sheyla Moyer MD 7/25/2023 1441    O : LLA Tissue Prostate SURGICAL PATHOLOGY Sheyla Moyer MD 7/25/2023 1441        Implants:  * No implants in log *      Drains: * No LDAs found *    Findings: see op note        Detailed Description of Procedure:   Operative Note                Patient: Juli Leong

## 2023-07-25 NOTE — ANESTHESIA POSTPROCEDURE EVALUATION
Department of Anesthesiology  Postprocedure Note    Patient: Hanna Brian  MRN: [id number]  YOB: 1960  Date of evaluation: 7/25/2023      Procedure Summary     Date: 07/25/23 Room / Location:  MAIN OR 03 /  MAIN OR    Anesthesia Start: 1459 Anesthesia Stop: 1536    Procedure: MRI FUSION PROSTATE BIOPSY (Perineum) Diagnosis:       Elevated PSA      (Elevated PSA [R97.20])    Providers: Latricia Mace MD Responsible Provider: Omari Redmond MD    Anesthesia Type: general ASA Status: 3          Anesthesia Type: No value filed.     Aniyah Phase I: Aniyah Score: 8    Aniyah Phase II: Aniyah Score: 10      Anesthesia Post Evaluation    Patient location during evaluation: PACU  Patient participation: complete - patient participated  Level of consciousness: awake and awake and alert  Airway patency: patent  Nausea & Vomiting: no nausea  Complications: no  Cardiovascular status: hemodynamically stable  Respiratory status: acceptable  Hydration status: euvolemic  Multimodal analgesia pain management approach

## 2023-07-25 NOTE — H&P
Malini Denson  : 1960         Chief Complaint   Patient presents with    Follow-up         HPI      Malini Denson is a 57 y.o. male   hx of elevated PSA. the patient had a negative prostate biopsy in the past with Dr. Angeline Green. PSA was 5.6 and 2012 when he had this biopsy his PSA was 9.0 . Had negative prostate biopsy by Dr. Jay Pompa in -. 50 gm prostate. PSA 6.0 then. Referred back by Rylan ORTIZ for follow up of elevated PSA. Total PSA 6.7 with 17.35 free PSA in . He c/o weak stream and ED. Can't afford Viagra . started tamsulosin in . PSA 5.6 in -. PSA 6.4 in -. Had 3+ benign feeling prostate in . PSA 8.4 in 10-21. He has no complaints, voiding well on tamsulosin. No dysuria or hematuria. In , total PSA 5.8, 22.2% free PSA. In , total PSA 9.4 with 18.1% free PSA. PSA 8.3 in -. Past Medical History        Past Medical History:   Diagnosis Date    Arthritis      Bone fracture      cracked right ankle    Elevated PSA      Hypertension      Ligament tear of lower extremity      right ankle         Past Surgical History         Past Surgical History:   Procedure Laterality Date    ANKLE FRACTURE SURGERY Right ,     COLONOSCOPY       COLSC FLX W/REMOVAL LESION BY HOT BX FORCEPS   2015          HERNIA REPAIR Left 2018     inguinal         Current Facility-Administered Medications          Current Outpatient Medications   Medication Sig Dispense Refill    tamsulosin (FLOMAX) 0.4 MG capsule Take 1 capsule by mouth daily 30 capsule 12    amLODIPine (NORVASC) 5 MG tablet TAKE ONE (1) TABLET BY MOUTH DAILY        hydroCHLOROthiazide (MICROZIDE) 12.5 MG capsule Take 12.5 mg by mouth daily        traZODone (DESYREL) 50 MG tablet TAKE 1 TABLET BY MOUTH NIGHTLY          No current facility-administered medications for this visit.          No Known Allergies  Social History               Socioeconomic History    Marital status: hyperintensity on the high b value diffusion-weighted  sequence. Analysis of the dynamic pre- and postgadolinium kinetics demonstrates  findings positive for focal, early enhancement. PI-RADS classification: 4 (high  probability for the presence of clinically significant cancer). The prostatic capsule appears intact. The rectoprostatic angle and neurovascular  bundles are unremarkable. The seminal vesicles are symmetric and unremarkable. The urinary bladder is unremarkable. Remainder of the pelvis: No enlarged lymph nodes are identified in the pelvis. No aggressive appearing lesions in the visualized bony pelvis. IMPRESSION:  1. Lesion 1 (PI-RADS 4): A 1.1 cm lesion located at the posterior right  peripheral zone in the mid gland, 7 o'clock, has a PI-RADS classification 4,  high probability for a clinically significant cancer). 2. Lesion 2 (PI-RADS 4): A 0.9 cm lesion located at the lateral left  transitional zone in the mid gland, 3 o'clock, has a PI-RADS classification 4,  high probability for a clinically significant cancer). 3. There are changes of benign prostatic hypertrophy.   Plan MRI fusion prostate bopsy

## 2023-07-25 NOTE — DISCHARGE INSTRUCTIONS
If you have had surgery in the past 7-10 days by one of our providers and are having fever, bleeding, or drainage from an incision, have an opening in an incision, or having issues urinating properly, please call [phone]. Prostate Biopsy and Ultrasound:   A prostate biopsy is a type of test. Your doctor takes small tissue samples from your prostate gland. Then another doctor looks at the tissue under a microscope to see if there are cancer cells. This test is done by a doctor who specializes in men's genital and urinary problems (urologist). It can be done in your doctor's office, a day surgery clinic, or a hospital operating room. To get the tissue samples from the prostate, the doctor inserts a thin needle through the rectum, the urethra, or the area between the anus and scrotum (perineum). The most common method is through the rectum. Your doctor may use ultrasound to help guide the needle. What else should you know about this test?  A prostate biopsy has a slight risk of causing problems such as infection or bleeding. If the biopsy went through your rectum, you may have a small amount of bleeding from your rectum for 2 to 3 days after the biopsy. You may have a little pain in your pelvic area. You may also have a little blood in your urine for 1 to 5 days. You may have some blood in your semen for a week or longer. Do not do heavy work or exercise for 4 hours after the test.  Your doctor will tell you how long it may take to get your results back. Follow-up care is a key part of your treatment and safety. Be sure to make and go to all appointments, and call your doctor if you are having problems. It's also a good idea to keep a list of the medicines you take. Ask your doctor when you can expect to have your test results.     After general anesthesia or intravenous sedation, for 24 hours or while taking prescription Narcotics:  Limit your activities  A responsible adult needs to be with you for the next 24 hours  Do not drive and operate hazardous machinery  Do not make important personal or business decisions  Do not drink alcoholic beverages  If you have not urinated within 8 hours after discharge, and you are experiencing discomfort from urinary retention, please go to the nearest ED. If you have sleep apnea and have a CPAP machine, please use it for all naps and sleeping. Please use caution when taking narcotics and any of your home medications that may cause drowsiness. *  Please give a list of your current medications to your Primary Care Provider. *  Please update this list whenever your medications are discontinued, doses are      changed, or new medications (including over-the-counter products) are added. *  Please carry medication information at all times in case of emergency situations. These are general instructions for a healthy lifestyle:  No smoking/ No tobacco products/ Avoid exposure to second hand smoke  Surgeon General's Warning:  Quitting smoking now greatly reduces serious risk to your health. Obesity, smoking, and sedentary lifestyle greatly increases your risk for illness  A healthy diet, regular physical exercise & weight monitoring are important for maintaining a healthy lifestyle    You may be retaining fluid if you have a history of heart failure or if you experience any of the following symptoms:  Weight gain of 3 pounds or more overnight or 5 pounds in a week, increased swelling in our hands or feet or shortness of breath while lying flat in bed. Please call your doctor as soon as you notice any of these symptoms; do not wait until your next office visit.

## 2023-08-11 ENCOUNTER — OFFICE VISIT (OUTPATIENT)
Dept: UROLOGY | Age: 63
End: 2023-08-11
Payer: MEDICARE

## 2023-08-11 DIAGNOSIS — D07.5 PIN III (PROSTATIC INTRAEPITHELIAL NEOPLASIA III): ICD-10-CM

## 2023-08-11 DIAGNOSIS — N40.1 BPH WITH OBSTRUCTION/LOWER URINARY TRACT SYMPTOMS: ICD-10-CM

## 2023-08-11 DIAGNOSIS — R97.20 ELEVATED PSA: Primary | ICD-10-CM

## 2023-08-11 DIAGNOSIS — N13.8 BPH WITH OBSTRUCTION/LOWER URINARY TRACT SYMPTOMS: ICD-10-CM

## 2023-08-11 PROCEDURE — 99213 OFFICE O/P EST LOW 20 MIN: CPT | Performed by: UROLOGY

## 2023-08-11 ASSESSMENT — ENCOUNTER SYMPTOMS
BACK PAIN: 0
NAUSEA: 0

## 2023-08-11 NOTE — PROGRESS NOTES
[facility]  [address]  [phone]    Katy Gomez  : 1960    Chief Complaint   Patient presents with    Results        HPI     Katy Gomez is a 57 y.o. male     hx of elevated PSA. the patient had a negative prostate biopsy in the past with Dr. Agustin Vu. PSA was 5.6 and 2012 when he had this biopsy his PSA was 9.0 . Had negative prostate biopsy by Dr. Pato Mackay in -18. 50 gm prostate. PSA 6.0 then. Referred back by Mac ORTIZ for follow up of elevated PSA. Total PSA 6.7 with 17.35 free PSA in . He c/o weak stream and ED. Can't afford Viagra . started tamsulosin in . PSA 5.6 in . PSA 6.4 in . Had 3+ benign feeling prostate in . PSA 8.4 in 10-21. He has no complaints, voiding well on tamsulosin. No dysuria or hematuria. In , total PSA 5.8, 22.2% free PSA. In , total PSA 9.4 with 18.1% free PSA. PSA 8.3 in . MRI 5-10-23; FINDINGS:  Prostate gland size: 5.7 cm transverse x 4.7 cm AP x 5.3 cm craniocaudal.        Peripheral zone:  Lesion 1: There is a 1.1 cm lesion with focal homogeneous hypointensity on the  ADC map (series 650 image 10), located on the posterior right peripheral zone in  the mid gland at 7 o'clock 1 cm from the midline. There is associated  hyperintensity on the high b value diffusion-weighted sequence. There is  associated focal hypointensity on the T2 sequences. Analysis of the dynamic pre-  and postgadolinium kinetics demonstrates findings positive for focal, early  enhancement. PI-RADS classification: 4 (high probability for the presence of  clinically significant cancer). Transition zone: Signal changes consistent with benign prostatic hypertrophy are  noted. Lesion 2: There is a 0.9 cm focal homogeneously T2 hypointense lesion (series 5  image 10), located on the lateral left transitional zone in the mid gland at 3  o'clock 1.5 cm from the midline.

## 2024-02-19 ENCOUNTER — NURSE ONLY (OUTPATIENT)
Dept: UROLOGY | Age: 64
End: 2024-02-19

## 2024-02-19 DIAGNOSIS — R97.20 ELEVATED PSA: ICD-10-CM

## 2024-02-20 LAB
PSA FREE MFR SERPL: 21.3 %
PSA FREE SERPL-MCNC: 1.7 NG/ML
PSA SERPL-MCNC: 8 NG/ML

## 2024-03-19 ENCOUNTER — TELEPHONE (OUTPATIENT)
Dept: UROLOGY | Age: 64
End: 2024-03-19

## 2024-07-24 DIAGNOSIS — N40.1 BPH WITH OBSTRUCTION/LOWER URINARY TRACT SYMPTOMS: ICD-10-CM

## 2024-07-24 DIAGNOSIS — N13.8 BPH WITH OBSTRUCTION/LOWER URINARY TRACT SYMPTOMS: ICD-10-CM

## 2024-07-24 RX ORDER — TAMSULOSIN HYDROCHLORIDE 0.4 MG/1
CAPSULE ORAL DAILY
Qty: 90 CAPSULE | Refills: 4 | Status: SHIPPED | OUTPATIENT
Start: 2024-07-24

## 2024-09-16 ENCOUNTER — LAB (OUTPATIENT)
Dept: UROLOGY | Age: 64
End: 2024-09-16

## 2024-09-16 DIAGNOSIS — N40.1 BPH WITH OBSTRUCTION/LOWER URINARY TRACT SYMPTOMS: Primary | ICD-10-CM

## 2024-09-16 DIAGNOSIS — R97.20 ELEVATED PSA: ICD-10-CM

## 2024-09-16 DIAGNOSIS — N13.8 BPH WITH OBSTRUCTION/LOWER URINARY TRACT SYMPTOMS: Primary | ICD-10-CM

## 2024-09-16 LAB
PSA FREE MFR SERPL: 24 %
PSA FREE SERPL-MCNC: 1.7 NG/ML
PSA SERPL-MCNC: 7.2 NG/ML (ref 0–4)

## 2025-06-15 ENCOUNTER — HOSPITAL ENCOUNTER (EMERGENCY)
Age: 65
Discharge: HOME | End: 2025-06-15
Attending: EMERGENCY MEDICINE
Payer: MEDICARE

## 2025-06-15 ENCOUNTER — APPOINTMENT (OUTPATIENT)
Dept: GENERAL RADIOLOGY | Age: 65
End: 2025-06-15
Payer: MEDICARE

## 2025-06-15 VITALS
RESPIRATION RATE: 16 BRPM | DIASTOLIC BLOOD PRESSURE: 74 MMHG | OXYGEN SATURATION: 92 % | HEART RATE: 74 BPM | HEIGHT: 70 IN | BODY MASS INDEX: 37.22 KG/M2 | SYSTOLIC BLOOD PRESSURE: 157 MMHG | WEIGHT: 260 LBS | TEMPERATURE: 98.3 F
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
## 2025-06-15 DIAGNOSIS — M54.16 LUMBAR RADICULOPATHY: ICD-10-CM

## 2025-06-15 DIAGNOSIS — S39.012A STRAIN OF LUMBAR REGION, INITIAL ENCOUNTER: ICD-10-CM

## 2025-06-15 LAB
APPEARANCE UR: CLEAR
BACTERIA URNS QL MICRO: 0 /HPF
BILIRUB UR QL: NEGATIVE
COLOR UR: ABNORMAL
EPI CELLS #/AREA URNS HPF: ABNORMAL /HPF
GLUCOSE UR STRIP.AUTO-MCNC: NEGATIVE MG/DL
HGB UR QL STRIP: NEGATIVE
KETONES UR QL STRIP.AUTO: NEGATIVE MG/DL
LEUKOCYTE ESTERASE UR QL STRIP.AUTO: ABNORMAL
NITRITE UR QL STRIP.AUTO: NEGATIVE
OTHER OBSERVATIONS: ABNORMAL
PH UR STRIP: 6 (ref 5–9)
PROT UR STRIP-MCNC: NEGATIVE MG/DL
RBC #/AREA URNS HPF: ABNORMAL /HPF
SP GR UR REFRACTOMETRY: 1.01 (ref 1–1.02)
UROBILINOGEN UR QL STRIP.AUTO: 1 EU/DL (ref 0.2–1)
WBC URNS QL MICRO: ABNORMAL /HPF

## 2025-06-15 PROCEDURE — 6370000000 HC RX 637 (ALT 250 FOR IP): Performed by: EMERGENCY MEDICINE

## 2025-06-15 PROCEDURE — 72100 X-RAY EXAM L-S SPINE 2/3 VWS: CPT

## 2025-06-15 PROCEDURE — 99284 EMERGENCY DEPT VISIT MOD MDM: CPT

## 2025-06-15 PROCEDURE — 96372 THER/PROPH/DIAG INJ SC/IM: CPT

## 2025-06-15 PROCEDURE — 6360000002 HC RX W HCPCS: Performed by: EMERGENCY MEDICINE

## 2025-06-15 PROCEDURE — 81001 URINALYSIS AUTO W/SCOPE: CPT

## 2025-06-15 RX ORDER — TRAMADOL HYDROCHLORIDE 50 MG/1
50 TABLET ORAL EVERY 6 HOURS PRN
Qty: 12 TABLET | Refills: 0 | Status: SHIPPED | OUTPATIENT
Start: 2025-06-15 | End: 2025-06-18

## 2025-06-15 RX ORDER — CYCLOBENZAPRINE HCL 10 MG
10 TABLET ORAL
Status: COMPLETED | OUTPATIENT
Start: 2025-06-15 | End: 2025-06-15

## 2025-06-15 RX ORDER — PREDNISONE 10 MG/1
5 TABLET ORAL DAILY
Status: DISCONTINUED | OUTPATIENT
Start: 2025-06-16 | End: 2025-06-15

## 2025-06-15 RX ORDER — PREDNISONE 50 MG/1
50 TABLET ORAL
Status: COMPLETED | OUTPATIENT
Start: 2025-06-15 | End: 2025-06-15

## 2025-06-15 RX ORDER — KETOROLAC TROMETHAMINE 30 MG/ML
30 INJECTION, SOLUTION INTRAMUSCULAR; INTRAVENOUS
Status: COMPLETED | OUTPATIENT
Start: 2025-06-15 | End: 2025-06-15

## 2025-06-15 RX ORDER — METHYLPREDNISOLONE 4 MG/1
TABLET ORAL
Qty: 1 KIT | Refills: 0 | Status: SHIPPED | OUTPATIENT
Start: 2025-06-15

## 2025-06-15 RX ADMIN — PREDNISONE 50 MG: 50 TABLET ORAL at 22:29

## 2025-06-15 RX ADMIN — KETOROLAC TROMETHAMINE 30 MG: 30 INJECTION, SOLUTION INTRAMUSCULAR at 22:29

## 2025-06-15 RX ADMIN — CYCLOBENZAPRINE 10 MG: 10 TABLET, FILM COATED ORAL at 22:29

## 2025-06-15 ASSESSMENT — PAIN SCALES - GENERAL
PAINLEVEL_OUTOF10: 7
PAINLEVEL_OUTOF10: 4

## 2025-06-15 ASSESSMENT — PAIN - FUNCTIONAL ASSESSMENT: PAIN_FUNCTIONAL_ASSESSMENT: 0-10

## 2025-06-15 ASSESSMENT — LIFESTYLE VARIABLES
HOW OFTEN DO YOU HAVE A DRINK CONTAINING ALCOHOL: NEVER
HOW MANY STANDARD DRINKS CONTAINING ALCOHOL DO YOU HAVE ON A TYPICAL DAY: PATIENT DOES NOT DRINK
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
## 2025-06-16 NOTE — ED NOTES
Patient mobility status ambulates with no difficulty.     I have reviewed discharge instructions with the patient.  The patient verbalized understanding.    Patient left ED via Discharge Method: ambulatory to Home with Significant Other.    Opportunity for questions and clarification provided.     Patient given 2 scripts.
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
## (undated) DEVICE — UNIVERSAL FIXATION CANNULA: Brand: VERSAONE

## (undated) DEVICE — SUT ETHBND 0 30IN SH GRN --

## (undated) DEVICE — SUTURE SZ 0 27IN 5/8 CIR UR-6  TAPER PT VIOLET ABSRB VICRYL J603H

## (undated) DEVICE — GLOVE SURG SZ 8 CRM LTX FREE POLYISOPRENE POLYMER BEAD ANTI

## (undated) DEVICE — JELLY,LUBE,STERILE,FLIP TOP,TUBE,4-OZ: Brand: MEDLINE

## (undated) DEVICE — [HIGH FLOW INSUFFLATOR,  DO NOT USE IF PACKAGE IS DAMAGED,  KEEP DRY,  KEEP AWAY FROM SUNLIGHT,  PROTECT FROM HEAT AND RADIOACTIVE SOURCES.]: Brand: PNEUMOSURE

## (undated) DEVICE — BLADELESS OPTICAL TROCAR WITH FIXATION CANNULA: Brand: VERSAPORT

## (undated) DEVICE — JELLY LUBRICATING 10GM PREFIL SYR LUBE

## (undated) DEVICE — DEVICE FIX 5MM TI FOR LAP HERN REP PROTACK

## (undated) DEVICE — DISSECTOR ENDOSCP EXTRA VW OVL DISTENSION BLLN

## (undated) DEVICE — BLUNT TROCAR WITH THREADED ANCHOR: Brand: VERSAONE

## (undated) DEVICE — (D)PREP SKN CHLRAPRP APPL 26ML -- CONVERT TO ITEM 371833

## (undated) DEVICE — LAP CHOLE: Brand: MEDLINE INDUSTRIES, INC.

## (undated) DEVICE — NEEDLE HYPO 21GA L1.5IN INTRAMUSCULAR S STL LATCH BVL UP

## (undated) DEVICE — APPLICATOR BNDG 1MM ADH PREMIERPRO EXOFIN

## (undated) DEVICE — SOL ANTI-FOG 6ML MEDC -- MEDICHOICE - CONVERT TO 358427

## (undated) DEVICE — KIT BX PROST 20ML VI PREFIL W 10ML 10% NEUT BUFF FRMLN LEAK

## (undated) DEVICE — COVER PRB W1XL11.8IN ENDOCAVITY CLR E BND NEOGUARD

## (undated) DEVICE — SUTURE MCRYL SZ 4-0 L27IN ABSRB UD L19MM PS-2 1/2 CIR PRIM Y426H

## (undated) DEVICE — TRAY CATH 16F DRN BG LTX -- CONVERT TO ITEM 363158

## (undated) DEVICE — HOLDER PRB URONAV

## (undated) DEVICE — STERILE PACKED. BORE DIAMETER 1.6 MM; ANGLE OF INSERTION 19° TO THE LONG AXIS OF THE TRANSDUCER: Brand: SINGLE-USE BIPLANE BIOPSY GUIDE

## (undated) DEVICE — REM POLYHESIVE ADULT PATIENT RETURN ELECTRODE: Brand: VALLEYLAB

## (undated) DEVICE — DRAPE TWL SURG 16X26IN BLU ORB04] ALLCARE INC]

## (undated) DEVICE — CLIP SUT ENDOSCP F/2-0/3-0/4-0 -- LAPRA-TY

## (undated) DEVICE — MAX-CORE® DISPOSABLE CORE BIOPSY INSTRUMENT, 18G X 25CM: Brand: MAX-CORE